# Patient Record
(demographics unavailable — no encounter records)

---

## 2025-02-03 NOTE — ASSESSMENT
[FreeTextEntry1] : CHB s/p MDT DC PPM implant (LB pacing)  - [facility] today. Changes made as above.  - Device interrogation normal. I interrogated and reprogrammed the device as described above.  - Brief PAF events. Brief NSVT events.  - The patient is on remote and transmitting.  - EKG NOV.   PAF ChadsVasc 2 (age, HTN?)   Patient was educated on modifiable bleeding risk factors such as correct dosage, frequency, adherence, to avoid concomitant use of over-the -counter nonsteroidal anti-inflammatory medications, excessive alcohol intake, and compliance with antihypertensive meds to control blood pressure. Potential complications associated with OAC such as bleeding , signs and symptoms, when to call office and when to seek immediate medical attention/ ER visit discussed in great details. Fall prevention, to avoid hazardous activities discussed. We reviewed medication costs and alternatives such as Warfarin if other medications are too costly.  Patient wants to think about taking OAC. Will continue to monitor for now. He will discuss with his cardiologist.   NSVT - 2 brief episodes. I recommend ischemic work up if none recent.   HTN  - Manual recheck improved. - Continue to monitor. BP log at home.   Left shoulder pain - Follow up PCP / ortho for possible imaging. Told he can have MRI in hospital setting as device needs to be reprogrammed in MRI mode.   Follow up with Dr. Carson as scheduled  RTO in 9 months

## 2025-02-03 NOTE — HISTORY OF PRESENT ILLNESS
[de-identified] : Cardio:Dr. Malcolm Carson EP: Dr. East PCP: Dr. Varghese Ortega  73-year-old male with no significant PMHx presented to the hospital s/p syncope. He was found to have CHB. He underwent placement of MDT DC PPM. He presents for follow up. He has left shoulder pain since PPM implant.

## 2025-02-03 NOTE — PROCEDURE
[See Device Printout] : See device printout [Pacemaker] : pacemaker [Lead Imp:  ___ohms] : lead impedance was [unfilled] ohms [Sensing Amplitude ___mv] : sensing amplitude was [unfilled] mv [___V @] : [unfilled] V [___ ms] : [unfilled] ms [Complete Heart Block] : complete heart block [de-identified] : MIRELLA [de-identified] : Shana [de-identified] : [id number] [de-identified] : 10/27/2023 [de-identified] : MICKEY [de-identified] : 60/130 [de-identified] : 10.5 years [de-identified] : Reprogrammed to DDD, atrial sensitivity lowered to 0.30mV, PVARP extended to 300ms [de-identified] : 2 AT/AF events c/w afib, longest 43 minutes, burden <0.1% 2 brief NSVT episodes, longest 3 seconds, 9 beats @ 217 bpm

## 2025-04-17 NOTE — HISTORY OF PRESENT ILLNESS
[de-identified] : Cardio:Dr. Malcolm Carson EP: Dr. East PCP: Dr. Varghese Ortega  73-year-old male with no significant PMHx presented to the hospital s/p syncope. He was found to have CHB. He underwent placement of MDT DC PPM. He presents for follow up. He has left shoulder pain since PPM implant.

## 2025-04-17 NOTE — ASSESSMENT
[FreeTextEntry1] : CHB s/p MDT DC PPM implant (LB pacing)  - [facility] today. Changes made as above.  - Device interrogation normal. I interrogated and reprogrammed the device as described above.  - Brief PAF events. Brief NSVT events.  - The patient is on remote and transmitting.  - EKG NOV.   PAF ChadsVasc 2 (age, HTN?)   Patient was educated on modifiable bleeding risk factors such as correct dosage, frequency, adherence, to avoid concomitant use of over-the -counter nonsteroidal anti-inflammatory medications, excessive alcohol intake, and compliance with antihypertensive meds to control blood pressure. Potential complications associated with OAC such as bleeding , signs and symptoms, when to call office and when to seek immediate medical attention/ ER visit discussed in great details. Fall prevention, to avoid hazardous activities discussed. We reviewed medication costs and alternatives such as Warfarin if other medications are too costly.  Patient wants to think about taking OAC. Will continue to monitor for now. He will discuss with his cardiologist.   NSVT - 2 brief episodes. I recommend ischemic work up if none recent.   HTN  - Manual recheck improved. - Continue to monitor. BP log at home.   Left shoulder pain - Follow up PCP / ortho for possible imaging. Told he can have MRI in hospital setting as device needs to be reprogrammed in MRI mode.   Follow up with Dr. Carson as scheduled  RTO in 9 months

## 2025-04-17 NOTE — PROCEDURE
[See Device Printout] : See device printout [Pacemaker] : pacemaker [Lead Imp:  ___ohms] : lead impedance was [unfilled] ohms [Sensing Amplitude ___mv] : sensing amplitude was [unfilled] mv [___V @] : [unfilled] V [___ ms] : [unfilled] ms [Voltage: ___ volts] : Voltage was [unfilled] volts [Longevity: ___ months] : The estimated remaining battery life is [unfilled] months [Normal] : The battery status is normal. [Threshold Testing Performed] : Threshold testing was performed [Programmed for Longevity] : output reprogrammed for improved battery longevity [Counters Reset] : the counters were reset [de-identified] : MIRELLA [de-identified] : Shana [de-identified] : [id number] [de-identified] : 10/27/2023 [de-identified] : MICKEY [de-identified] : 60/130 [de-identified] : AP: 45.0% // : 99.8% 1 high v-rate episode on 4/14/2025 c/w possible SVT. Pt. was running during the time.  Transmitting on [employer].